# Patient Record
Sex: MALE | Race: WHITE | NOT HISPANIC OR LATINO | URBAN - METROPOLITAN AREA
[De-identification: names, ages, dates, MRNs, and addresses within clinical notes are randomized per-mention and may not be internally consistent; named-entity substitution may affect disease eponyms.]

---

## 2022-08-13 ENCOUNTER — OFFICE VISIT (OUTPATIENT)
Dept: URGENT CARE | Facility: PHYSICIAN GROUP | Age: 32
End: 2022-08-13
Payer: COMMERCIAL

## 2022-08-13 VITALS
DIASTOLIC BLOOD PRESSURE: 60 MMHG | HEIGHT: 72 IN | TEMPERATURE: 98.1 F | HEART RATE: 91 BPM | WEIGHT: 250 LBS | OXYGEN SATURATION: 95 % | BODY MASS INDEX: 33.86 KG/M2 | SYSTOLIC BLOOD PRESSURE: 104 MMHG
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
## 2022-08-13 DIAGNOSIS — S05.02XA ABRASION OF LEFT CORNEA, INITIAL ENCOUNTER: ICD-10-CM

## 2022-08-13 PROCEDURE — 99203 OFFICE O/P NEW LOW 30 MIN: CPT | Performed by: NURSE PRACTITIONER

## 2022-08-13 RX ORDER — BUPROPION HYDROCHLORIDE 300 MG/1
TABLET ORAL
COMMUNITY
Start: 2022-06-19

## 2022-08-13 RX ORDER — CLONIDINE HYDROCHLORIDE 0.1 MG/1
TABLET ORAL
COMMUNITY
Start: 2022-06-19

## 2022-08-13 RX ORDER — METHYLPHENIDATE HYDROCHLORIDE 10 MG/1
TABLET ORAL
COMMUNITY
Start: 2022-07-15

## 2022-08-13 RX ORDER — ERYTHROMYCIN 5 MG/G
1 OINTMENT OPHTHALMIC 3 TIMES DAILY
Qty: 3.5 G | Refills: 0 | Status: SHIPPED | OUTPATIENT
Start: 2022-08-13 | End: 2022-08-20

## 2022-08-13 ASSESSMENT — ENCOUNTER SYMPTOMS
HEADACHES: 0
EYE DISCHARGE: 1
EYE PAIN: 1
NAUSEA: 0
FEVER: 0
BLURRED VISION: 0
PHOTOPHOBIA: 1
EYE REDNESS: 1
DOUBLE VISION: 0

## 2022-08-13 ASSESSMENT — LIFESTYLE VARIABLES: SUBSTANCE_ABUSE: 0

## 2022-08-13 NOTE — PROGRESS NOTES
Gilberto Lopez is a 32 y.o. male who presents for Eye Problem (Dog scratched L eye )      HPI Gilberto is a 32-year-old male presents with a left eye problem.  He reports that his dog excellently scratched his left eye yesterday.  Yesterday his pain in his eye was more significant than it is today.  His eye has been watering almost nonstop.  He tried a cold pack to his eye which helped her feel little bit better.  Resting overnight he believes made his pain better today.  He wears glasses.  He does not wear contacts.  No other aggravating leaving factors.    Review of Systems   Constitutional:  Negative for fever.   Eyes:  Positive for photophobia (mild), pain, discharge and redness. Negative for blurred vision and double vision.   Gastrointestinal:  Negative for nausea.   Neurological:  Negative for headaches.   Endo/Heme/Allergies:  Negative for environmental allergies.   Psychiatric/Behavioral:  Negative for substance abuse.      Allergies:     No Known Allergies    PMSFS Hx:  History reviewed. No pertinent past medical history.  History reviewed. No pertinent surgical history.  History reviewed. No pertinent family history.  Social History     Tobacco Use    Smoking status: Never    Smokeless tobacco: Never   Substance Use Topics    Alcohol use: No       Problems:   There is no problem list on file for this patient.      Medications:   Current Outpatient Medications on File Prior to Visit   Medication Sig Dispense Refill    buPROPion (WELLBUTRIN XL) 300 MG XL tablet       cloNIDine (CATAPRES) 0.1 MG Tab       methylphenidate (RITALIN) 10 MG Tab TAKE 1 TABLET BY MOUTH TWICE DAILY AT 8AM AND 2PM      albuterol (VENTOLIN OR PROVENTIL) 108 (90 BASE) MCG/ACT AERS Inhale 2 Puffs by mouth every 6 hours as needed for Shortness of Breath. 1 Inhaler 0     No current facility-administered medications on file prior to visit.          Objective:     /60 (BP Location: Left arm, Patient Position: Sitting, BP Cuff Size: Adult)    Pulse 91   Temp 36.7 °C (98.1 °F) (Temporal)   Ht 1.829 m (6')   Wt 113 kg (250 lb)   SpO2 95%   BMI 33.91 kg/m²     Physical Exam  Vitals and nursing note reviewed.   Constitutional:       General: He is not in acute distress.     Appearance: Normal appearance. He is well-developed. He is not toxic-appearing.   HENT:      Head: Normocephalic.   Eyes:      General: Lids are normal. Lids are everted, no foreign bodies appreciated.         Left eye: Discharge (watery) present.     Extraocular Movements: Extraocular movements intact.      Conjunctiva/sclera:      Left eye: Left conjunctiva is injected.      Pupils: Pupils are equal, round, and reactive to light.      Left eye: Corneal abrasion and fluorescein uptake present.     Cardiovascular:      Rate and Rhythm: Normal rate.   Pulmonary:      Effort: Pulmonary effort is normal. No respiratory distress.   Skin:     General: Skin is warm and dry.   Neurological:      Mental Status: He is alert.   Psychiatric:         Speech: Speech normal.         Behavior: Behavior normal. Behavior is cooperative.         Assessment /Associated Orders:      1. Abrasion of left cornea, initial encounter  erythromycin 5 MG/GM Ointment      2. Dog scratch  erythromycin 5 MG/GM Ointment            Medical Decision Making:    Pt is clinically stable at today's acute urgent care visit.  No acute distress noted. Appropriate for outpatient care at this time.   Acute problem today .     Warm  and or cold compresses BID prn discomfort  Keep well hydrated  Liquid tears prn pain   Educated in proper administration of medication(s) ordered today including safety, possible SE, risks, benefits, rationale and alternatives to therapy.     Discussed Dx, management options (risks,benefits, and alternatives to planned treatment), natural progression and supportive care.  Expressed understanding and the treatment plan was agreed upon. Questions were encouraged and answered   Return to urgent care  prn if new or worsening sx or if there is no improvement in condition prn.    Educated in Red flags and indications to immediately call 911 or present to the Emergency Department.           Please note that this dictation was created using voice recognition software. I have worked with consultants from the vendor as well as technical experts from [facility] to optimize the interface. I have made every reasonable attempt to correct obvious errors, but I expect that there are errors of grammar and possibly content that I did not discover before finalizing the note.  This note was electronically signed by provider

## 2023-02-05 ENCOUNTER — HOSPITAL ENCOUNTER (EMERGENCY)
Facility: MEDICAL CENTER | Age: 33
End: 2023-02-05
Attending: EMERGENCY MEDICINE
Payer: COMMERCIAL

## 2023-02-05 ENCOUNTER — APPOINTMENT (OUTPATIENT)
Dept: RADIOLOGY | Facility: MEDICAL CENTER | Age: 33
End: 2023-02-05
Attending: EMERGENCY MEDICINE
Payer: COMMERCIAL

## 2023-02-05 VITALS
DIASTOLIC BLOOD PRESSURE: 89 MMHG | TEMPERATURE: 98 F | RESPIRATION RATE: 16 BRPM | HEIGHT: 72 IN | BODY MASS INDEX: 35.18 KG/M2 | WEIGHT: 259.7 LBS | SYSTOLIC BLOOD PRESSURE: 150 MMHG | HEART RATE: 86 BPM | OXYGEN SATURATION: 98 %

## 2023-02-05 DIAGNOSIS — S42.292A OTHER CLOSED DISPLACED FRACTURE OF PROXIMAL END OF LEFT HUMERUS, INITIAL ENCOUNTER: ICD-10-CM

## 2023-02-05 PROCEDURE — 99284 EMERGENCY DEPT VISIT MOD MDM: CPT

## 2023-02-05 PROCEDURE — 700111 HCHG RX REV CODE 636 W/ 250 OVERRIDE (IP): Performed by: EMERGENCY MEDICINE

## 2023-02-05 PROCEDURE — 73030 X-RAY EXAM OF SHOULDER: CPT | Mod: LT

## 2023-02-05 PROCEDURE — 96372 THER/PROPH/DIAG INJ SC/IM: CPT

## 2023-02-05 RX ORDER — ONDANSETRON 4 MG/1
4 TABLET, ORALLY DISINTEGRATING ORAL ONCE
Status: COMPLETED | OUTPATIENT
Start: 2023-02-05 | End: 2023-02-05

## 2023-02-05 RX ORDER — HYDROCODONE BITARTRATE AND ACETAMINOPHEN 5; 325 MG/1; MG/1
1 TABLET ORAL EVERY 6 HOURS PRN
Qty: 20 TABLET | Refills: 0 | Status: SHIPPED | OUTPATIENT
Start: 2023-02-05 | End: 2023-02-10

## 2023-02-05 RX ORDER — MORPHINE SULFATE 4 MG/ML
4 INJECTION INTRAVENOUS ONCE
Status: COMPLETED | OUTPATIENT
Start: 2023-02-05 | End: 2023-02-05

## 2023-02-05 RX ADMIN — MORPHINE SULFATE 4 MG: 4 INJECTION INTRAVENOUS at 12:24

## 2023-02-05 RX ADMIN — ONDANSETRON 4 MG: 4 TABLET, ORALLY DISINTEGRATING ORAL at 12:23

## 2023-02-05 NOTE — ED PROVIDER NOTES
"  ER Provider Note    Scribed for Pranay Wiley M.d. by Naif Foss. 2/5/2023  11:57 AM    Primary Care Provider: Pcp Pt States None    CHIEF COMPLAINT  Chief Complaint   Patient presents with    Shoulder Injury     Pt was indoor rock climbing  30 minutes prior to arrival when he fell about 12 feet and injured his L shoulder. Pt reporting L shoulder pain, elbow and wrist pain. CMS intact.  Pt reports \"little tingling in finger tips\"     EXTERNAL RECORDS REVIEWED  Inpatient Notes no recent inpatient visits noted    HPI/ROS  LIMITATION TO HISTORY   Select: : None  OUTSIDE HISTORIAN(S):  None    Gilberto Pranay Lopez is a 32 y.o. male who presents to the ED complaining of left shoulder injury occuring 30 minutes ago while rock climbing. Patient was indoor rock climbing when he fell 12 ft onto his left shoulder. He endorses associated left shoulder, elbow, and wrist pain. He notes the pain has since worsened. No head trauma or loss of consciousness.    PAST MEDICAL HISTORY  History reviewed. No pertinent past medical history.    SURGICAL HISTORY  History reviewed. No pertinent surgical history.    FAMILY HISTORY  History reviewed. No pertinent family history.    SOCIAL HISTORY   reports that he has never smoked. He has never used smokeless tobacco. He reports current drug use. Drug: Marijuana. He reports that he does not drink alcohol.    CURRENT MEDICATIONS  Previous Medications    ALBUTEROL (VENTOLIN OR PROVENTIL) 108 (90 BASE) MCG/ACT AERS    Inhale 2 Puffs by mouth every 6 hours as needed for Shortness of Breath.    BUPROPION (WELLBUTRIN XL) 300 MG XL TABLET        CLONIDINE (CATAPRES) 0.1 MG TAB        METHYLPHENIDATE (RITALIN) 10 MG TAB    TAKE 1 TABLET BY MOUTH TWICE DAILY AT 8AM AND 2PM       ALLERGIES  Patient has no known allergies.    PHYSICAL EXAM  /84   Pulse 87   Temp 36.3 °C (97.4 °F) (Temporal)   Resp 18   Ht 1.829 m (6')   Wt 118 kg (259 lb 11.2 oz)   SpO2 98%   BMI 35.22 kg/m² "   Constitutional: Well developed, Well nourished, No acute distress, Non-toxic appearance.   HENT: Normocephalic, Atraumatic, Bilateral external ears normal.  Eyes:  conjunctiva are normal.   Neck: Supple. No midline tenderness  Thorax & Lungs: No respiratory distress. Breathing comfortably. Chest wall is nontender.  Skin: Warm, Dry, No erythema,   Back: No midline tenderness  Musculoskeletal: TTP to L shoulder, no overt signs of deformity, limited ROM secondary to pain.   Neurologic: Alert & oriented x 3, Normal motor function, Normal sensory function, No focal deficits noted.   Psychiatric: Affect normal, Judgment normal, Mood normal.      DIAGNOSTIC STUDIES    Radiology:   The attending emergency physician has independently interpreted the diagnostic imaging associated with this visit and am waiting the final reading from the radiologist.   Preliminary interpretation is a follows: Comminuted fracture of the left proximal humerus        Radiologist interpretation:   DX-SHOULDER 2+ LEFT   Final Result      Comminuted intra-articular humeral surgical neck and greater tuberosity fractures are displaced up to 8 mm           COURSE & MEDICAL DECISION MAKING     ED Observation Status? No; Patient does not meet criteria for ED Observation.     INITIAL ASSESSMENT, COURSE AND PLAN  Care Narrative: Patient presents for evaluation.  Clinically the patient is tender about the left shoulder but no overt clinical findings of dislocation.  X-rays obtained does show comminuted fracture of the proximal aspect of the head of the humerus.  I will start the patient on pain medications with a sling.  The patient is to follow-up with orthopedics for further outpatient treatment and care.    12:00 PM - Patient was evaluated at bedside. Ordered L Shoulder XR to evaluate. Patient will be treated with morphine 4 mg and Zofran 4 mg. Patient and/or family verbalizes understanding to the plan of care.     Differential Diagnoses:  Differential  diagnoses include but are not limited to Fracture, Dislocation     1:12 PM - Patient was reevaluated at bedside. Discussed radiology results with the patient and/or family. The plan for discharge was discussed. Patient and/or family was given the opportunity to ask any questions. Patient and/or family verbalizes understanding and agreement to this plan of care.       HTN/IDDM FOLLOW UP:  The patient is referred to a primary physician for blood pressure management, diabetic screening, and for all other preventive health concerns    DISPOSITION AND DISCUSSIONS  I have discussed management of the patient with the following physicians and JERED's:  None    Discussion of management with other QHP or appropriate source(s): None     Escalation of care considered, and ultimately not performed: IV fluids and blood analysis.    Barriers to care at this time, including but not limited to:  None .     Decision tools and prescription drugs considered including, but not limited to:  None .    FINAL DIANGOSIS  1. Other closed displaced fracture of proximal end of left humerus, initial encounter         Naif RAHMAN (Chidi), am scribing for, and in the presence of, Pranay Wiley M.D..    Electronically signed by: Naif Foss (Chidi), 2/5/2023    IPranay M.D. personally performed the services described in this documentation, as scribed by Naif Foss in my presence, and it is both accurate and complete.      The note accurately reflects work and decisions made by me.  Pranay Wiley M.D.  2/5/2023  1:19 PM

## 2023-02-05 NOTE — ED NOTES
Pt ambulated back to [facility] from triage. Pt able to transfer self to bed, friend at bedside, call light in reach. Chart up for ERP.

## 2023-02-05 NOTE — ED TRIAGE NOTES
"Chief Complaint   Patient presents with    Shoulder Injury     Pt was indoor rock climbing  30 minutes prior to arrival when he fell about 12 feet and injured his L shoulder. Pt reporting L shoulder pain, elbow and wrist pain. CMS intact.  Pt reports \"little tingling in finger tips\"         Pt ambulated to triage with sling in place for above complaint.  Pt is AO x 4, follows commands, and responds appropriately to questions. Patient's breathing is unlabored and pain is currently 7/10 on the 0-10 pain scale.  Pt placed in lobby. Patient educated on triage process and encouraged to alert staff for any changes.      /84   Pulse 87   Temp 36.3 °C (97.4 °F) (Temporal)   Resp 18   Ht 1.829 m (6')   Wt 118 kg (259 lb 11.2 oz)   SpO2 98%     "

## 2023-02-15 PROBLEM — S42.202A CLOSED FRACTURE OF LEFT PROXIMAL HUMERUS: Status: ACTIVE | Noted: 2023-02-15
